# Patient Record
Sex: MALE | Race: WHITE
[De-identification: names, ages, dates, MRNs, and addresses within clinical notes are randomized per-mention and may not be internally consistent; named-entity substitution may affect disease eponyms.]

---

## 2018-08-08 ENCOUNTER — HOSPITAL ENCOUNTER (OUTPATIENT)
Dept: HOSPITAL 47 - OR | Age: 78
Discharge: HOME | End: 2018-08-08
Payer: MEDICARE

## 2018-08-08 VITALS — TEMPERATURE: 97.6 F

## 2018-08-08 VITALS — BODY MASS INDEX: 23.3 KG/M2

## 2018-08-08 VITALS — SYSTOLIC BLOOD PRESSURE: 158 MMHG | DIASTOLIC BLOOD PRESSURE: 65 MMHG | HEART RATE: 67 BPM

## 2018-08-08 VITALS — RESPIRATION RATE: 16 BRPM

## 2018-08-08 DIAGNOSIS — N40.0: ICD-10-CM

## 2018-08-08 DIAGNOSIS — I71.4: ICD-10-CM

## 2018-08-08 DIAGNOSIS — E78.00: ICD-10-CM

## 2018-08-08 DIAGNOSIS — Z79.899: ICD-10-CM

## 2018-08-08 DIAGNOSIS — M19.90: ICD-10-CM

## 2018-08-08 DIAGNOSIS — K40.90: Primary | ICD-10-CM

## 2018-08-08 DIAGNOSIS — K64.9: ICD-10-CM

## 2018-08-08 DIAGNOSIS — Z79.82: ICD-10-CM

## 2018-08-08 DIAGNOSIS — Z87.891: ICD-10-CM

## 2018-08-08 DIAGNOSIS — I10: ICD-10-CM

## 2018-08-08 PROCEDURE — 49650 LAP ING HERNIA REPAIR INIT: CPT

## 2018-08-08 RX ADMIN — POTASSIUM CHLORIDE SCH MLS: 14.9 INJECTION, SOLUTION INTRAVENOUS at 09:58

## 2018-08-08 NOTE — P.OP
Date of Procedure: 08/08/18


Procedure(s) Performed: 


PREOPERATIVE DIAGNOSIS: Left inguinal hernia


POSTOPERATIVE DIAGNOSIS: Same


PROCEDURE: Laparoscopic repair left inguinal hernia with the da Masha robot 

assistance with mesh


SURGEON: Comfort


EBL: Minimal


ANESTHESIA: General


COMPLICATIONS: None


OPERATIVE PROCEDURE: Patient was placed in the operating table in the supine 

position.  The patient was placed under general anesthesia.  The patient was 

then placed in lithotomy.  The abdomen was prepped and draped in usual sterile 

fashion.  A small curvilinear supraumbilical incision was made.  The fascia was 

retracted anteriorly with Dick forceps.  The Veress needle was inserted.  The 

saline drop test was normal.  Insufflation took place to 15 mmHg.  A 5 mm 

trocar was then inserted.  2 additional 8 mm trochars were placed in the right 

upper quadrant and left upper quadrant under visualization.  The initial 5 mm 

trocar was then switched to a 12 mm trocar.  The patient's bladder was quite 

full and a Ahumada catheter was placed under aseptic conditions.  The robotic 

arms were then brought in and docked into place.  The fenestrated bipolar was 

used in the left arm and the laparoscopic will was utilized in the right arm.

  A 30 12 mm scope was used in the up position.  The peritoneal cavity was 

inspected.  The patient had a large indirect left inguinal hernia no visible 

right inguinal hernia was seen.  The peritoneum was incised in a horizontal 

fashion cephalad to the internal inguinal ring.  Following that careful 

dissection of the preperitoneal space took place.  This took place using both 

electrocautery, sharp dissection but primarily blunt dissection.  Visualization 

of the pubic tubercle and Brandyn's ligament took place medially.  Full 

dissection took place laterally as well.  The hernia sac was fully dissected.  

Once we had adequate space the 15 x 10 progrip mesh was advanced into the 

preperitoneal space and flattened out appropriately to cover all potential 

hernia sites.  No sutures were used.  The peritoneal defect was then closed 

using a locking 2-0 VLok suture.  A small defect in the peritoneum was closed 

using a figure-of-eight 3-0 Vicryl stitch.  The pneumoperitoneum was then 

evacuated.  The fascia at the 12 mm site was closed using the Leonel Villarreal 

technique and an 0 Vicryl stitch.  The skin of all 3 sites was closed using a 4-

0 Monocryl stitch.  Steri-Strips and sterile dressings were applied.


DISPOSITION: Stable to recovery room

## 2018-08-08 NOTE — P.GSHP
History of Present Illness


H&P Date: 08/08/18


Chief Complaint: Left inguinal hernia





78-year-old male seen in the office in June.  Patient has complaints of a bulge 

in the left groin.  Increasing in size.  No history of prior hernia.  No bowel 

habit changes.





Past Medical History


Past Medical History: Hypertension, Osteoarthritis (OA)


History of Any Multi-Drug Resistant Organisms: None Reported


Past Surgical History: Orthopedic Surgery


Additional Past Surgical History / Comment(s): colonoscopy x3, carpal tunnel 

left hand


Past Anesthesia/Blood Transfusion Reactions: No Reported Reaction


Smoking Status: Former smoker





- Past Family History


  ** Mother


Family Medical History: Cancer





  ** Brother(s)


Family Medical History: Deep Vein Thrombosis (DVT)





Medications and Allergies


 Home Medications











 Medication  Instructions  Recorded  Confirmed  Type


 


Ascorbic Acid [Vitamin C] 500 mg PO DAILY 08/01/18 08/01/18 History


 


Aspirin 81 mg PO DAILY 08/01/18 08/01/18 History


 


Cholecalciferol [Vitamin D3] 1,000 unit PO DAILY 08/01/18 08/01/18 History


 


Doxazosin Mesylate [Cardura] 4 mg PO DAILY 08/01/18 08/01/18 History


 


Garlic 1 each PO DAILY 08/01/18 08/01/18 History


 


Glucosam/Chond/Hyalu/Cf Borate 1 each PO DAILY 08/01/18 08/01/18 History





[Move Free Joint Health Tablet]    


 


Multivitamin [Men's Multi-Vitamin] 1 each PO DAILY 08/01/18 08/01/18 History


 


Omega-3 Fatty Acids/Fish Oil [Fish 1 each PO DAILY 08/01/18 08/01/18 History





Oil 1,000 mg Softgel]    


 


Vit A/Vit C/Vit E/Zinc/Copper 1 cap PO DAILY 08/01/18 08/01/18 History





[ICAPS SOFTGEL]    











 Allergies











Allergy/AdvReac Type Severity Reaction Status Date / Time


 


No Known Allergies Allergy   Verified 08/01/18 11:40














Surgical - Exam








Physical exam:


General: Well-developed, well-nourished


HEENT: Normocephalic, sclerae nonicteric


Abdomen: Nontender, nondistended, reducible moderate size left inguinal hernia


Extremities: No edema


Neuro: Alert and oriented





Assessment and Plan


(1) Left inguinal hernia


Narrative/Plan: 


Patient with recent diagnosis of symptomatic left inguinal hernia.  We'll 

proceed with operative repair with the laparoscopic approach da Masha 

assistance.  Risks of bleeding, infection, recurrence, bladder and bowel injury

, numbness, nerve injury, conversion to an open procedure were discussed with 

the patient.  The patient understands and wishes to proceed.


Status: Acute   Code(s): K40.90 - UNIL INGUINAL HERNIA, W/O OBST OR GANGR, NOT 

SPCF AS RECUR   OMED Code(s): 190532182